# Patient Record
(demographics unavailable — no encounter records)

---

## 2025-02-07 NOTE — LETTER BODY
[FreeTextEntry1] : Ryan Lozano DO [address] [facility] [address], [place] [id number] [phone]  Dear Dr. Lozano,  Reason for visit: Prostate cancer, on active surveillance. BPH. Kidney stones.  This is a 68 year-old Cantonese speaking gentleman with elevated PSA, kidney stones, BPH, and prostate cancer on active surveillance. The patient is accompanied by his sister. Patient had a stroke in 2022 and has dysarthria. His prostate biopsy demonstrated evidence of [place] 6 adenocarcinoma of the prostate involving 5 of 12 cores. Patient is here for follow-up. Since he was last seen, patient reports of kidney stones. Patient reports taking Flomax for his BPH. Patient reports taking the medications regularly without any side effects or difficulties with the medication. He reports stable symptoms on medical therapy. He denies any fevers or chills. He denies any pain. Patient denies any changes in health. Patient has history of stroke. The past medical history and family history and social history are unchanged. All other review of systems are negative. Patient denies any changes in medications. Medication list was reconciled.  On examination, the patient is a healthy-appearing gentleman in no acute distress. He is alert and oriented follows commands. He has normal mood and affect. He is normocephalic. Neck is supple. Oral no thrush Respirations are unlabored. His abdomen is soft and nontender. Bladder is nonpalpable. No CVA tenderness. Neurologically he is grossly intact. No peripheral edema. Skin without gross abnormality.    His BMP shows normal renal function, creatinine 1.03. His PSA is 5.63.  Assessment: Prostate cancer on active surveillance. BPH. Kidney stones.  I counseled the patient. In terms of his kidney stones, I recommended the patient obtain renal ultrasound to evaluate for stone burden. In terms of prostate cancer, patient has [place] 6 prostate cancer. He will repeat prostate MRI for fusion targeted prostate biopsy and to ensure stability. I counseled the patient regarding the procedure. The patient will take the necessary preparations for the procedure, including fleet enema. I recommended the patient repeat PSA and BMP to establish baseline. I believe he is favorable candidate for active surveillance. I recommend the patient continue with active surveillance and follow-up for serial PSA screening and MARY to ensure stability. Risks and benefits were discussed. Alternatives were given. I answered the patient questions. The patient will follow-up as directed and will contact me with any questions or concerns, or he has made a decision regarding the treatment of his prostate cancer.  Patient will continue longitudinal care for his complex and serious chronic condition.  Plan: Renal ultrasound. Repeat prostate MRI. Consider fusion biopsy. PSA. BMP.     I personally reviewed ultrasound images with the patient today and images demonstrated hydronephrosis in his left kidney. I discussed the significance of the findings. I recommended patient obtains a CT scan to evaluate for further evaluation. I counseled the patient regarding the procedure. The patient will take the necessary preparations for the procedure.    Plan: CT scan. Follow-up as directed  I spent 30-minutes time today on all issues related to this patient on today date of service including non face to face time.

## 2025-02-07 NOTE — ADDENDUM
[FreeTextEntry1] : Entered by César Abdul, acting as scribe for Dr. Arnulfo Bryant. The documentation recorded by the scribe accurately reflects the service I personally performed and the decisions made by me.

## 2025-02-07 NOTE — LETTER BODY
[FreeTextEntry1] : Ryan Lozano DO [address] [facility] [address], [place] [id number] [phone]  Dear Dr. Lozano,  Reason for visit: Prostate cancer, on active surveillance. BPH. Kidney stones.  This is a 68 year-old Cantonese speaking gentleman with elevated PSA, kidney stones, BPH, and prostate cancer on active surveillance. The patient is accompanied by his sister. Patient had a stroke in 2022 and has dysarthria. His prostate biopsy demonstrated evidence of [place] 6 adenocarcinoma of the prostate involving 5 of 12 cores. Patient is here for follow-up. Since he was last seen, patient reports of kidney stones. Patient reports taking Flomax for his BPH. Patient reports taking the medications regularly without any side effects or difficulties with the medication. He reports stable symptoms on medical therapy. He denies any fevers or chills. He denies any pain. Patient denies any changes in health. Patient has history of stroke. The past medical history and family history and social history are unchanged. All other review of systems are negative. Patient denies any changes in medications. Medication list was reconciled.  On examination, the patient is a healthy-appearing gentleman in no acute distress. He is alert and oriented follows commands. He has normal mood and affect. He is normocephalic. Neck is supple. Oral no thrush Respirations are unlabored. His abdomen is soft and nontender. Bladder is nonpalpable. No CVA tenderness. Neurologically he is grossly intact. No peripheral edema. Skin without gross abnormality.    His BMP shows normal renal function, creatinine 1.03. His PSA is 5.63.  Assessment: Prostate cancer on active surveillance. BPH. Kidney stones.  I counseled the patient. In terms of his kidney stones, I recommended the patient obtain renal ultrasound to evaluate for stone burden. In terms of prostate cancer, patient has [place] 6 prostate cancer. He will repeat prostate MRI for fusion targeted prostate biopsy and to ensure stability. I counseled the patient regarding the procedure. The patient will take the necessary preparations for the procedure, including fleet enema. I recommended the patient repeat PSA and BMP to establish baseline. I believe he is favorable candidate for active surveillance. I recommend the patient continue with active surveillance and follow-up for serial PSA screening and MARY to ensure stability. Risks and benefits were discussed. Alternatives were given. I answered the patient questions. The patient will follow-up as directed and will contact me with any questions or concerns, or he has made a decision regarding the treatment of his prostate cancer.  Patient will continue longitudinal care for his complex and serious chronic condition.  Plan: Renal ultrasound. Repeat prostate MRI. Consider fusion biopsy. PSA. BMP.     I personally reviewed ultrasound images with the patient today and images demonstrated hydronephrosis in his left kidney. I discussed the significance of the findings. I recommended patient obtains a CT scan to evaluate for further evaluation. I counseled the patient regarding the procedure. The patient will take the necessary preparations for the procedure.    Plan: CT scan. Follow-up as directed  I spent 30-minutes time today on all issues related to this patient on today date of service including non face to face time.

## 2025-03-25 NOTE — LETTER BODY
[FreeTextEntry1] : Ryan Lozano DO [address] [facility] [address], [place] [phone]  Dear Dr. Lozano,  Reason for visit: Prostate cancer, on active surveillance. BPH. Kidney stones. Abnormal CT scan.  This is a 68 year-old Cantonese speaking gentleman with elevated PSA, kidney stones, BPH, and prostate cancer on active surveillance. The patient is accompanied by his sister. Patient had a stroke in 2022 and has dysarthria. His prostate biopsy demonstrated evidence of [place] 6 adenocarcinoma of the prostate involving 5 of 12 cores. Patient is here for follow-up. Since he was last seen, patient had repeat prostate biopsy done which demonstrated progression of his prostate cancer from [place] 6 to [place] 7. Patient also had CT scan done for kidney stones which demonstrated mild left hydronephrosis and left sided mirroring. Patient reports taking Flomax for his BPH. Patient reports taking the medications regularly without any side effects or difficulties with the medication. He reports stable symptoms on medical therapy. He denies any fevers or chills. He denies any pain. Patient denies any changes in health. Patient has history of stroke. The past medical history and family history and social history are unchanged. All other review of systems are negative. Patient denies any changes in medications. Medication list was reconciled.  On examination, the patient is a healthy-appearing gentleman in no acute distress. He is alert and oriented follows commands. He has normal mood and affect. He is normocephalic. Neck is supple. Oral no thrush Respirations are unlabored. His abdomen is soft and nontender. Bladder is nonpalpable. No CVA tenderness. Neurologically he is grossly intact. No peripheral edema. Skin without gross abnormality.  His BMP shows normal renal function, creatinine 1.03. His PSA is 5.63.  I personally reviewed CT scan images with the patient today and images demonstrated mild left hydronephrosis and left sided mirroring.   Prostate biopsy demonstrated progression of his prostate cancer to Meena 7 adenocarcinoma of the prostate, previously [place] 6.   Assessment: Prostate cancer on active surveillance. BPH. Kidney stones. Abnormal CT scan. Possible stricture.  I counseled the patient. In terms of his prostate cancer, patient's recent repeat prostate biopsy demonstrated evidence of prostate cancer progression from previous [place] 6 to currently [place] 7 prostate cancer. I discussed the significance of the findings. I discussed the treatment options with the patient. Given his previous stroke and heart problem, patient declined surgical intervention. He expressed interest in radiation therapy. In terms of his abnormal CT scan, patient demonstrated evidence of mild left hydronephrosis and left sided mirroring, likely stricture. Given his prostate cancer, patient declined intervention for the possible stricture. Risks and benefits were discussed. Alternatives were given. I answered the patient questions. The patient will follow-up as directed and will contact me with any questions or concerns, or he has made a decision regarding the treatment of his prostate cancer. Thank you for the opportunity to participate in the care of this patient, I will keep you updated on his progress.  Patient will continue longitudinal care for his complex and serious chronic condition.  Plan: Radiation therapy. Follow-up in 1 month.  I spent 30-minutes time today on all issues related to this patient on today date of service including non face to face time.
[FreeTextEntry1] : Ryan Lozano DO [address] [facility] [address], [place] [id number] [phone]  Dear Dr. Lozano,  Reason for visit: Prostate cancer, on active surveillance. BPH. Kidney stones. Abnormal CT scan.  This is a 68 year-old Cantonese speaking gentleman with elevated PSA, kidney stones, BPH, and prostate cancer on active surveillance. The patient is accompanied by his sister. Patient had a stroke in 2022 and has dysarthria. His prostate biopsy demonstrated evidence of [place] 6 adenocarcinoma of the prostate involving 5 of 12 cores. Patient is here for follow-up. Since he was last seen, patient had repeat prostate biopsy done which demonstrated progression of his prostate cancer from Walker 6 to Walker 7. Patient also had CT scan done for kidney stones which demonstrated mild left hydronephrosis and left sided mirroring. Patient reports taking Flomax for his BPH. Patient reports taking the medications regularly without any side effects or difficulties with the medication. He reports stable symptoms on medical therapy. He denies any fevers or chills. He denies any pain. Patient denies any changes in health. Patient has history of stroke. The past medical history and family history and social history are unchanged. All other review of systems are negative. Patient denies any changes in medications. Medication list was reconciled.  On examination, the patient is a healthy-appearing gentleman in no acute distress. He is alert and oriented follows commands. He has normal mood and affect. He is normocephalic. Neck is supple. Oral no thrush Respirations are unlabored. His abdomen is soft and nontender. Bladder is nonpalpable. No CVA tenderness. Neurologically he is grossly intact. No peripheral edema. Skin without gross abnormality.  His BMP shows normal renal function, creatinine 1.03. His PSA is 5.63.  I personally reviewed CT scan images with the patient today and images demonstrated mild left hydronephrosis and left sided mirroring.   Prostate biopsy demonstrated progression of his prostate cancer to Meena 7 adenocarcinoma of the prostate, previously Walker 6.   Assessment: Prostate cancer on active surveillance. BPH. Kidney stones. Abnormal CT scan. Possible stricture.  I counseled the patient. In terms of his prostate cancer, patient's recent repeat prostate biopsy demonstrated evidence of prostate cancer progression from previous Walker 6 to currently Walker 7 prostate cancer. I discussed the significance of the findings. I discussed the treatment options with the patient. Given his previous stroke and heart problem, patient declined surgical intervention. He expressed interest in radiation therapy. In terms of his abnormal CT scan, patient demonstrated evidence of mild left hydronephrosis and left sided mirroring, likely stricture. Given his prostate cancer, patient declined intervention for the possible stricture. Risks and benefits were discussed. Alternatives were given. I answered the patient questions. The patient will follow-up as directed and will contact me with any questions or concerns, or he has made a decision regarding the treatment of his prostate cancer. Thank you for the opportunity to participate in the care of this patient, I will keep you updated on his progress.  Patient will continue longitudinal care for his complex and serious chronic condition.  Plan: Radiation therapy. Follow-up in 1 month.  I spent 30-minutes time today on all issues related to this patient on today date of service including non face to face time.
5

## 2025-03-25 NOTE — HISTORY OF PRESENT ILLNESS
[FreeTextEntry1] : follow-up for bilateral stone and mild left hydronephrosis, CT done, patient return to review result. Asymptomatic Follow-up for prostate cancer, re-biosy on 3/16/2025 due to increased PSA, return to review result  Please refer to URO Consult Note.

## 2025-05-09 NOTE — LETTER BODY
[FreeTextEntry1] : Ryan Lozano DO [address] [facility] [address], [place] [id number] [phone]  Dear Dr. Lozano,  Reason for visit: Prostate cancer, on active surveillance. BPH. Kidney stones. Abnormal CT scan.  This is a 68 year-old Cantonese speaking gentleman with elevated PSA, kidney stones, BPH, and prostate cancer on active surveillance. The patient is accompanied by his sister. Patient had a stroke in 2022 and has dysarthria. His prostate biopsy demonstrated evidence of [place] 6 adenocarcinoma of the prostate involving 5 of 12 cores. His repeat prostate biopsy which demonstrated progression of his prostate cancer from Meena 6 to Meena 7. Patient also had CT scan done for kidney stones which demonstrated mild left hydronephrosis and left sided mirroring. Patient is here for follow-up. Since he was last seen, patient reports currently undergoing radiation therapy. He reports progressive urinary symptoms. However, upon further questioning, patient reports that he did not take Flomax as directed. He denies any fevers or chills. He denies any pain. Patient denies any changes in health. Patient has history of stroke. The past medical history and family history and social history are unchanged. All other review of systems are negative. Patient denies any changes in medications. Medication list was reconciled.  On examination, the patient is a healthy-appearing gentleman in no acute distress. He is alert and oriented follows commands. He has normal mood and affect. He is normocephalic. Neck is supple. Oral no thrush Respirations are unlabored. His abdomen is soft and nontender. Bladder is nonpalpable. No CVA tenderness. Neurologically he is grossly intact. No peripheral edema. Skin without gross abnormality.  His BMP shows normal renal function, creatinine 1.13. His PSA is 6.88.   Post-void residual on bladder scan today was 300 cc.   Assessment: Prostate cancer on active surveillance. BPH. Kidney stones. Abnormal CT scan. Possible stricture.  I counseled the patient. In terms of his prostate cancer, patient is currently undergoing radiation therapy. I encouraged him to continue with radiation therapy. In terms of his BPH, the patient reports progressive urinary symptoms. His PVR today was 300 cc which is elevated. However, upon further questioning, patient is non compliant with medication and did not take Flomax as directed. I encouraged the patient to take Flomax as directed. I discussed the potential side effects of the medication. I counseled the patient on its use and side effects. If the patient develops any side effects, the patient will discontinue medication and contact me. In terms of his abnormal CT scan, patient demonstrated evidence of mild left hydronephrosis. Patient declined intervention as he is currently undergoing radiation therapy. He will obtain BMP to evaluate renal function. Risks and benefits were discussed. Alternatives were given. I answered the patient questions. The patient will follow-up as directed and will contact me with any questions or concerns, or he has made a decision regarding the treatment of his prostate cancer. Thank you for the opportunity to participate in the care of this patient, I will keep you updated on his progress.  Patient will continue longitudinal care for his complex and serious chronic condition.  Plan: Take Flomax. BMP. Continue radiation therapy. Follow-up in 1 month.  I spent 30-minutes time today on all issues related to this patient on today date of service including non face to face time.

## 2025-05-09 NOTE — HISTORY OF PRESENT ILLNESS
[FreeTextEntry1] : F/U for BPH, Rx Flomax that patient is not taking, weak urine stream in AM only, PVR 306ml  F/U for prostate cancer, on radiation therapy now. PSA 6.88 in Feb 2025 F/U for stone, CT shows possible stricture, declined intervention due to prostate cancer.

## 2025-05-30 NOTE — LETTER BODY
[FreeTextEntry1] : Ryan Lozano DO [address] [facility] [address], [place] [id number] [phone]  Dear Dr. Lozano,  Reason for visit: Prostate cancer, on active surveillance. BPH. Previous kidney stones. Previous abnormal CT scan.  This is a 69 year-old Cantonese speaking gentleman with elevated PSA, kidney stones, BPH, and prostate cancer status post radiation therapy the patient is accompanied by his sister. Patient had a stroke in 2022 and has dysarthria. His prostate biopsy demonstrated evidence of [place] 6 adenocarcinoma of the prostate involving 5 of 12 cores. His repeat prostate biopsy which demonstrated progression of his prostate cancer from [place] 6 to [place] 7. Patient also had CT scan done for kidney stones which demonstrated mild left hydronephrosis and left sided mirroring. Patient is here for follow-up. Since he was last seen, patient reports currently undergoing radiation therapy. He reports progressive urinary symptoms, including difficulty urinating. The patient reports that he had to decrease Flomax due to dizziness.  He denies any fevers or chills. He denies any pain. Patient denies any changes in health. Patient has history of stroke. The past medical history and family history and social history are unchanged. All other review of systems are negative. Patient denies any changes in medications. Medication list was reconciled.  On examination, the patient is a healthy-appearing gentleman in no acute distress. He is alert and oriented follows commands. He has normal mood and affect. He is normocephalic. Neck is supple. Oral no thrush Respirations are unlabored. His abdomen is soft and nontender. Bladder is nonpalpable. No CVA tenderness. Neurologically he is grossly intact. No peripheral edema. Skin without gross abnormality.   Post-void residual on bladder scan today was 489 cc.   Assessment: Prostate cancer status post radiation therapy  BPH. Previous kidney stones.  Incomplete bladder emptying.  I counseled the patient. In terms of his prostate cancer, patient is currently undergoing radiation therapy. I encouraged him to continue with radiation therapy. In terms of his BPH, the patient reports progressive urinary symptoms, including difficulty urinating. His PVR today was 489 cc which is elevated. The patient reports decreasing Flomax due to dizziness. Patient has not met treatment goals. I recommend the patient continue Flomax QD and add Proscar to the regiment. I discussed the potential side effects of the medication. I counseled the patient on its use and side effects. If the patient develops any side effects, the patient will discontinue the medication and contact me. The patient will have a Hammonds catheter placed today. I answered the patient questions. The patient will follow-up on Tuesday for a voiding trial and will contact me with any questions or concern. Thank you for the opportunity to participate in the care of this patient, I will keep you updated on his progress.  Patient will continue longitudinal care for his complex and serious chronic condition.   Plan: Continue Flomax. Add Proscar. BMP. Hammonds catheter placement. Continue radiation therapy. Follow-up on Tuesday for voiding trial.    A new Chinese Hammonds catheter was placed in the standard fashion without difficulty. The patient was covered with ciprofloxacin.

## 2025-05-30 NOTE — ADDENDUM
[FreeTextEntry1] :  Entered by Shanique Leavitt, acting as scribe for Dr Arnulfo Bryant. The documentation recorded by the scribe accurately reflects the service I personally performed and the decisions made by me.

## 2025-06-03 NOTE — ADDENDUM
[FreeTextEntry1] :  Entered by Shanique Leavitt, acting as scribe for Dr Arnulfo Bryant. The documentation recorded by the scribe accurately reflects the Ryan Lozano DO

## 2025-06-03 NOTE — LETTER BODY
[FreeTextEntry1] : [address] [address] [phone]  Dear Dr. Lozano,  Reason for visit: Prostate cancer, status post ration therapy complicated by urine retention.  BPH.  Previous kidney stones. Previous abnormal CT scan.  This is a 69 year-old Cantonese speaking gentleman with elevated PSA, kidney stones, BPH, and prostate cancer status post radiation therapy. Patient had a stroke in 2022 and has dysarthria. His prostate biopsy demonstrated evidence of Meena 6 adenocarcinoma of the prostate involving 5 of 12 cores. His repeat prostate biopsy which demonstrated progression of his prostate cancer from Meena 6 to Perrin 7. Patient also had CT scan done for kidney stones which demonstrated mild left hydronephrosis and left sided mirroring. The patient is currently completed his radiation therapy. Patient is here for follow-up. Since he was last seen, patient reports progressive urinary symptoms, including difficulty urinating. The patient underwent a voiding trial today and unfortunately failed it. The patient reports that he had to decrease Flomax due to dizziness. He denies any fevers or chills. He denies any pain. Patient denies any changes in health. Patient has history of stroke. The past medical history and family history and social history are unchanged. All other review of systems are negative. Patient denies any changes in medications. Medication list was reconciled.  On examination, the patient is a healthy-appearing gentleman in no acute distress. He is alert and oriented follows commands. He has normal mood and affect. He is normocephalic. Neck is supple. Oral no thrush Respirations are unlabored. His abdomen is soft and nontender. Bladder is nonpalpable. No CVA tenderness. Neurologically he is grossly intact. No peripheral edema. Skin without gross abnormality.  Patient was given a voiding trial today. He was unable to void spontaneously.   PVR was almost 300 cc.  A new 16 -Liberian Hammonds catheter was placed in standard fashion, without difficulty.  The instrumentation was covered with ciprofloxacin.ole  Assessment: Prostate cancer status post radiation therapy BPH. Previous kidney stones. Incomplete bladder emptying.  Urine retention.  I counseled the patient. In terms of his prostate cancer, patient is currently undergoing radiation therapy.  I encouraged him to continue with radiation therapy. In terms of his BPH, the patient reports progressive urinary symptoms, including difficulty urinating. He was given a voiding trial today and was unable to void spontaneously. I recommend the patient begin a trial of Casodex and follow up in two weeks for a voiding trial and Eligard injection to further shrink his prostate to relieve his urine obstruction. I recommend the patient continue Flomax QD and Proscar. I answered the patient questions. The patient will contact me with any questions or concern. Thank you for the opportunity to participate in the care of this patient, I will keep you updated on his progress.  Patient will continue longitudinal care for his complex and serious chronic condition.   Plan: Continue Flomax and Proscar. Continue radiation therapy. Begin Casodex. Follow up in two weeks for voiding trial.  Eligard injection 2 weeks

## 2025-06-21 NOTE — LETTER BODY
[FreeTextEntry1] : [address] [place] [address] [phone]  Dear Dr. Lozano,  REASON FOR VISIT: Prostate cancer, s/p radiation therapy. Urinary retention. BPH. Previous kidney stones.       This is a 69 year -old gentleman with prostate cancer, urinary retentionand BPH. The patient returns for Eligard injection and voiding trial. He denies any interval complaints or difficulties. Patient reports no pain. He has been doing well. Patient denies any changes in health. The past medical history and family history and social history are unchanged. All other review of systems are negative. Patient denies any changes in medications. Medication list was reconciled. He is currently taking calcium supplements and vitamin D for osteoporosis.     On examination, the patient is a healthy-appearing gentleman in no acute distress. He is alert and oriented follows commands. He has normal mood and affect. He is normocephalic. Oral no thrush. Neck is supple. Respirations are unlabored. His abdomen is soft and nontender. Liver is nonpalpable. Bladder is nonpalpable. No CVA tenderness. Neurologically he is grossly intact. No peripheral edema. Skin without gross abnormality.    The patient's right lower abdomen was cleaned with alcohol, 45 mg Eligard was applied subcutaneously. Patient tolerated procedure well.   Patient was given a voiding trial today. The patient's bladder was filled with 200 cc of water. Patient was able to unable spontaneously.  His postvoid residual was 430 cc.  A new 16 -Uzbek Hammonds catheter was placed in standard fashion, without difficulty.  The instrumentation was covered with ciprofloxacin.   Assessment: Prostate cancer on the androgen ablation, s/p radiation therapy. Urinary retention. BPH. Previous kidney stones.     I counseled the patient. He passed his voiding trial today. I encourage patient to continue with Eligard as LHRH agonist will help suppress his prostate cancer. He will obtain PSA to monitor efficacy of treatment. He will obtain BMP to ensure stability. In terms of his previous kidney stones, I recommended that he obtain a repeat renal ultrasound to ensure stability. I counseled the patient regarding the procedure.  In terms of urinary tension, patient return in 1 months time for repeat voiding trial.  Hopefully with continued androgen blockade, his prostate will decrease in size and relieve the bladder outlet obstruction.  The risks and benefits were discussed. Alternatives were given. I answered the patient's questions. The patient will take the necessary preparations for the procedure. The patient will follow-up as directed and will contact me with any questions or concerns. He will return in six months time for Eligard injection. Thank you for the opportunity to participate in the care of this patient. I'll keep you updated on his progress.    Plan: PSA. BMP. Renal ultrasound.  Return in 1 month for voiding trial.  Eligard in 6 months

## 2025-06-21 NOTE — LETTER BODY
[FreeTextEntry1] : [address] [place] [address] [phone]  Dear Dr. Lozano,  REASON FOR VISIT: Prostate cancer, s/p radiation therapy. Urinary retention. BPH. Previous kidney stones.       This is a 69 year -old gentleman with prostate cancer, urinary retentionand BPH. The patient returns for Eligard injection and voiding trial. He denies any interval complaints or difficulties. Patient reports no pain. He has been doing well. Patient denies any changes in health. The past medical history and family history and social history are unchanged. All other review of systems are negative. Patient denies any changes in medications. Medication list was reconciled. He is currently taking calcium supplements and vitamin D for osteoporosis.     On examination, the patient is a healthy-appearing gentleman in no acute distress. He is alert and oriented follows commands. He has normal mood and affect. He is normocephalic. Oral no thrush. Neck is supple. Respirations are unlabored. His abdomen is soft and nontender. Liver is nonpalpable. Bladder is nonpalpable. No CVA tenderness. Neurologically he is grossly intact. No peripheral edema. Skin without gross abnormality.    The patient's right lower abdomen was cleaned with alcohol, 45 mg Eligard was applied subcutaneously. Patient tolerated procedure well.   Patient was given a voiding trial today. The patient's bladder was filled with 200 cc of water. Patient was able to unable spontaneously.  His postvoid residual was 430 cc.  A new 16 -Stateless Hammonds catheter was placed in standard fashion, without difficulty.  The instrumentation was covered with ciprofloxacin.   Assessment: Prostate cancer on the androgen ablation, s/p radiation therapy. Urinary retention. BPH. Previous kidney stones.     I counseled the patient. He passed his voiding trial today. I encourage patient to continue with Eligard as LHRH agonist will help suppress his prostate cancer. He will obtain PSA to monitor efficacy of treatment. He will obtain BMP to ensure stability. In terms of his previous kidney stones, I recommended that he obtain a repeat renal ultrasound to ensure stability. I counseled the patient regarding the procedure.  In terms of urinary tension, patient return in 1 months time for repeat voiding trial.  Hopefully with continued androgen blockade, his prostate will decrease in size and relieve the bladder outlet obstruction.  The risks and benefits were discussed. Alternatives were given. I answered the patient's questions. The patient will take the necessary preparations for the procedure. The patient will follow-up as directed and will contact me with any questions or concerns. He will return in six months time for Eligard injection. Thank you for the opportunity to participate in the care of this patient. I'll keep you updated on his progress.    Plan: PSA. BMP. Renal ultrasound.  Return in 1 month for voiding trial.  Eligard in 6 months

## 2025-07-08 NOTE — HISTORY OF PRESENT ILLNESS
[FreeTextEntry1] : F/U for urinary retention, Hammonds inserted on 6/20/2025. Family called yesterday reports drainage system is leaking but unbale to give exact location where leakage from, patient return to have Hammonds catheter placement check. Clear urine note in drainage bag, no leaking noted anywhere, wife said urine was leaking because they did not close the port tight after emptying urine, also concerning stat lock not secured. Secure lock changed, extra drainage bag and stat lock provided, patient and family teaching about catheter care provided. Wife verbalized understanding, all questions addressed to their satisfactory  Please refer to URO Consult note  [id number]

## 2025-07-08 NOTE — HISTORY OF PRESENT ILLNESS
[FreeTextEntry1] : F/U for urinary retention, Hammonds inserted on 6/20/2025. Family called yesterday reports drainage system is leaking but unbale to give exact location where leakage from, patient return to have Hammonds catheter placement check. Clear urine note in drainage bag, no leaking noted anywhere, wife said urine was leaking because they did not close the port tight after emptying urine, also concerning stat lock not secured. Secure lock changed, extra drainage bag and stat lock provided, patient and family teaching about catheter care provided. Wife verbalized understanding, all questions addressed to their satisfactory  Please refer to URO Consult note

## 2025-07-08 NOTE — LETTER BODY
[FreeTextEntry1] : Earnest Lozano MD [address] [facility] [address] [id number] [phone]  Dear Dr. Lozano,  REASON FOR VISIT: Prostate cancer, s/p radiation therapy. Urinary retention. BPH. Previous kidney stones.    This is a 69 year -old gentleman with prostate cancer, urinary retention, and BPH. The patient returns for follow up. The patient had a Hammonds catheter inserted on 06/20/2025. His family called yesterday with reports that the drainage system is leaking but was unable to give the exact location where the leakage was coming from. The patient returns today to have his Hammonds catheter placement checked. Clear urine was noted in the drainage bag and there was no leakage noted. His wife said that urine was leaking because they did not close the port tight enough after emptying urine. Patient denies any changes in health. The past medical history and family history and social history are unchanged. All other review of systems are negative. Patient denies any changes in medications. Medication list was reconciled. He is currently taking calcium supplements and vitamin D for osteoporosis.     On examination, the patient is a healthy-appearing gentleman in no acute distress. He is alert and oriented follows commands. He has normal mood and affect. He is normocephalic. Oral no thrush. Neck is supple. Respirations are unlabored. His abdomen is soft and nontender. Liver is nonpalpable. Bladder is nonpalpable. No CVA tenderness. Neurologically he is grossly intact. No peripheral edema. Skin without gross abnormality.     Assessment: Prostate cancer on the androgen ablation, s/p radiation therapy. Urinary retention. BPH. Previous kidney stones.    I counseled the patient. No leakage was noted in his drainage system. His wife reports that urine was leaking because they did not close the port tight enough after emptying urine. She also had concern that the stat lock was no secured. We secured a lock change and provided an extra drainage bag and stat lock. Patient and family were taught after catheter care. Wife verbalized understanding. All questions were satisfactorily answered. The patient will follow-up as directed and will contact me with any questions or concerns. Thank you for the opportunity to participate in the care of this patient. I'll keep you updated on his progress.    Plan: Follow up as directed for catheter change.  Follow-up in 2 weeks for voiding trial.  I spent 20-minutes time today on all issues related to this patient on today date of service including non face to face time.

## 2025-07-08 NOTE — LETTER BODY
[FreeTextEntry1] : Earnest Lozano MD [address] \[facility]\"" [address] [id number] [phone]  Dear Dr. Lozano,  REASON FOR VISIT: Prostate cancer, s/p radiation therapy. Urinary retention. BPH. Previous kidney stones.    This is a 69 year -old gentleman with prostate cancer, urinary retention, and BPH. The patient returns for follow up. The patient had a Hammonds catheter inserted on 06/20/2025. His family called yesterday with reports that the drainage system is leaking but was unable to give the exact location where the leakage was coming from. The patient returns today to have his Hammonds catheter placement checked. Clear urine was noted in the drainage bag and there was no leakage noted. His wife said that urine was leaking because they did not close the port tight enough after emptying urine. Patient denies any changes in health. The past medical history and family history and social history are unchanged. All other review of systems are negative. Patient denies any changes in medications. Medication list was reconciled. He is currently taking calcium supplements and vitamin D for osteoporosis.     On examination, the patient is a healthy-appearing gentleman in no acute distress. He is alert and oriented follows commands. He has normal mood and affect. He is normocephalic. Oral no thrush. Neck is supple. Respirations are unlabored. His abdomen is soft and nontender. Liver is nonpalpable. Bladder is nonpalpable. No CVA tenderness. Neurologically he is grossly intact. No peripheral edema. Skin without gross abnormality.     Assessment: Prostate cancer on the androgen ablation, s/p radiation therapy. Urinary retention. BPH. Previous kidney stones.    I counseled the patient. No leakage was noted in his drainage system. His wife reports that urine was leaking because they did not close the port tight enough after emptying urine. She also had concern that the stat lock was no secured. We secured a lock change and provided an extra drainage bag and stat lock. Patient and family were taught after catheter care. Wife verbalized understanding. All questions were satisfactorily answered. The patient will follow-up as directed and will contact me with any questions or concerns. Thank you for the opportunity to participate in the care of this patient. I'll keep you updated on his progress.    Plan: Follow up as directed for catheter change.  Follow-up in 2 weeks for voiding trial.  I spent 20-minutes time today on all issues related to this patient on today date of service including non face to face time.  None